# Patient Record
Sex: FEMALE | Employment: OTHER | URBAN - METROPOLITAN AREA
[De-identification: names, ages, dates, MRNs, and addresses within clinical notes are randomized per-mention and may not be internally consistent; named-entity substitution may affect disease eponyms.]

---

## 2017-01-16 ENCOUNTER — TELEPHONE (OUTPATIENT)
Dept: CARDIOLOGY CLINIC | Age: 67
End: 2017-01-16

## 2017-01-16 NOTE — TELEPHONE ENCOUNTER
Spoke to patient's son, Leticia Acevedo. Identifiers x 2. Instructed to arrive 30 minutes prior to appt. , bring list of meds with dosage and frequency or bottles including vitamins and supplements, license and insurance cards.   .  To contact PCP regarding pertinent labs, etc.

## 2017-01-17 ENCOUNTER — OFFICE VISIT (OUTPATIENT)
Dept: CARDIOLOGY CLINIC | Age: 67
End: 2017-01-17

## 2017-01-17 VITALS
RESPIRATION RATE: 16 BRPM | OXYGEN SATURATION: 99 % | HEART RATE: 178 BPM | DIASTOLIC BLOOD PRESSURE: 60 MMHG | SYSTOLIC BLOOD PRESSURE: 98 MMHG | HEIGHT: 65 IN | BODY MASS INDEX: 24.19 KG/M2 | WEIGHT: 145.2 LBS

## 2017-01-17 DIAGNOSIS — I48.91 ATRIAL FIBRILLATION, UNSPECIFIED TYPE (HCC): Primary | ICD-10-CM

## 2017-01-17 DIAGNOSIS — I49.9 IRREGULAR HEART BEAT: ICD-10-CM

## 2017-01-17 PROBLEM — E03.9 ACQUIRED HYPOTHYROIDISM: Status: ACTIVE | Noted: 2017-01-17

## 2017-01-17 RX ORDER — HYDROCODONE BITARTRATE AND ACETAMINOPHEN 5; 325 MG/1; MG/1
1 TABLET ORAL AS NEEDED
COMMUNITY
End: 2017-02-17

## 2017-01-17 RX ORDER — MECLIZINE HYDROCHLORIDE 25 MG/1
25 TABLET ORAL AS NEEDED
COMMUNITY
End: 2017-02-17

## 2017-01-17 RX ORDER — LEVOTHYROXINE AND LIOTHYRONINE 38; 9 UG/1; UG/1
45 TABLET ORAL DAILY
COMMUNITY

## 2017-01-17 RX ORDER — DIGOXIN 250 MCG
TABLET ORAL
Qty: 32 TAB | Refills: 3 | Status: SHIPPED | OUTPATIENT
Start: 2017-01-17 | End: 2017-01-17 | Stop reason: SDUPTHER

## 2017-01-17 RX ORDER — DIGOXIN 250 MCG
0.25 TABLET ORAL DAILY
COMMUNITY
End: 2017-01-17 | Stop reason: SDUPTHER

## 2017-01-17 NOTE — PATIENT INSTRUCTIONS
Digoxin . 25 mg every 6 hours today only,then tomorrow 1 tablet daily    Start Xarelto 15 mg daily    Have blood work drawn today    Follow up with Chinmay Severino on Friday

## 2017-01-17 NOTE — MR AVS SNAPSHOT
Visit Information Date & Time Provider Department Dept. Phone Encounter #  
 1/17/2017  9:00 AM Lisbeth Alberto MD [facility] Riccardo Burns [phone] [id number] Follow-up Instructions Return in about 3 days (around 1/20/2017). Follow-up and Disposition History Upcoming Health Maintenance Date Due Hepatitis C Screening 1950 DTaP/Tdap/Td series (1 - Tdap) 12/15/1971 BREAST CANCER SCRN MAMMOGRAM 12/15/2000 FOBT Q 1 YEAR AGE 50-75 12/15/2000 ZOSTER VACCINE AGE 60> 12/15/2010 GLAUCOMA SCREENING Q2Y 12/15/2015 OSTEOPOROSIS SCREENING (DEXA) 12/15/2015 Pneumococcal 65+ Low/Medium Risk (1 of 2 - PCV13) 12/15/2015 MEDICARE YEARLY EXAM 12/15/2015 INFLUENZA AGE 9 TO ADULT 8/1/2016 Allergies as of 1/17/2017  Review Complete On: 1/17/2017 By: Lisbeth Alberto MD  
 No Known Allergies Current Immunizations  Never Reviewed No immunizations on file. Not reviewed this visit You Were Diagnosed With   
  
 Codes Comments [facility]    -  Primary ICD-10-CM: I48.91 
ICD-9-CM: 427.31 Irregular heart beat     ICD-10-CM: I49.9 ICD-9-CM: 427.9 Vitals BP Pulse Resp Height(growth percentile) Weight(growth percentile) SpO2  
 98/60 (BP 1 Location: Right arm, BP Patient Position: Sitting) (!) 178 16 5' 5\" (1.651 m) 145 lb 3.2 oz (65.9 kg) 99% BMI Smoking Status 24.16 kg/m2 Former Smoker BMI and BSA Data Body Mass Index Body Surface Area  
 24.16 kg/m 2 1.74 m 2 Your Updated Medication List  
  
   
This list is accurate as of: 1/17/17 10:23 AM.  Always use your most recent med list.  
  
  
  
  
 ARMOUR THYROID 60 mg tablet Generic drug:  thyroid (Pork) Take 60 mg by mouth daily. digoxin 0.25 mg tablet Commonly known as:  LANOXIN Take 0.25 mg by mouth daily. 1 tablet every 6 hours today Start tomorrow one tablet daily HYDROcodone-acetaminophen 5-325 mg per tablet Commonly known as:  Geena Fothergill Take 1 Tab by mouth as needed for Pain. meclizine 25 mg tablet Commonly known as:  ANTIVERT Take 25 mg by mouth as needed. XARELTO 15 mg Tab tablet Generic drug:  rivaroxaban Take 15 mg by mouth daily. We Performed the Following AMB POC EKG ROUTINE W/ 12 LEADS, INTER & REP [95183 CPT(R)] CBC WITH AUTOMATED DIFF [14544 CPT(R)] MAGNESIUM [id number] CPT(R)] METABOLIC PANEL, COMPREHENSIVE [97730 CPT(R)] TSH 3RD GENERATION [16622 CPT(R)] Follow-up Instructions Return in about 3 days (around 1/20/2017). Patient Instructions Digoxin . 25 mg every 6 hours today only,then tomorrow 1 tablet daily Start Xarelto 15 mg daily Have blood work drawn today Follow up with Vandana Both on Friday Patient Instructions History Introducing \[facility] [place] [facility]\"" & HEALTH SERVICES! Christopher Nick introduces [employer] patient portal. Now you can access parts of your medical record, email your doctor's office, and request medication refills online. 1. In your internet browser, go to [email or web address] 2. Click on the First Time User? Click Here link in the Sign In box. You will see the New Member Sign Up page. 3. Enter your [employer] Access Code exactly as it appears below. You will not need to use this code after youve completed the sign-up process. If you do not sign up before the expiration date, you must request a new code. · [employer] Access Code: [email or web address] Expires: 4/17/2017  7:35 AM 
 
4. Enter the last four digits of your Social Security Number (xxxx) and Date of Birth (mm/dd/yyyy) as indicated and click Submit. You will be taken to the next sign-up page. 5. Create a AutoMedxt ID. This will be your AutoMedxt login ID and cannot be changed, so think of one that is secure and easy to remember. 6. Create a AutoMedxt password. You can change your password at any time. 7. Enter your Password Reset Question and Answer. This can be used at a later time if you forget your password. 8. Enter your e-mail address. You will receive e-mail notification when new information is available in [address] 9. Click Sign Up. You can now view and download portions of your medical record. 10. Click the Download Summary menu link to download a portable copy of your medical information. If you have questions, please visit the Frequently Asked Questions section of the Brainjuicer website. Remember, Brainjuicer is NOT to be used for urgent needs. For medical emergencies, dial 911. Now available from your iPhone and Android! Please provide this summary of care documentation to your next provider. Your primary care clinician is listed as [address]. If you have any questions after today's visit, please call [phone].

## 2017-01-17 NOTE — PROGRESS NOTES
HISTORY OF PRESENT ILLNESS  Shirlean Libman is a 77 y.o. female. 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 Cardiovascular: Positive for palpitations. Neurological: Positive for dizziness. Visit Vitals    BP 98/60 (BP 1 Location: Right arm, BP Patient Position: Sitting)    Pulse (!) 178    Resp 16    Ht 5' 5\" (1.651 m)    Wt 65.9 kg (145 lb 3.2 oz)    SpO2 99%    BMI 24.16 kg/m2       Physical Exam   Neck: No JVD present. Carotid bruit is not present. Cardiovascular: An irregularly irregular rhythm present. Tachycardia present. Pulmonary/Chest: Effort normal and breath sounds normal.   Abdominal: Soft. Musculoskeletal: She exhibits no edema. Psychiatric: She has a normal mood and affect. No current outpatient prescriptions on file prior to visit. No current facility-administered medications on file prior to visit. ASSESSMENT and PLAN  PAF: now with AF with RVR, ecg with AF rate 178 and minor nstt. Discussed at length options.  She declines hospital admission at this time understanding implications  Her [id number] vasc score is 2 at this time (age and gender) discussed use of [address], in my opinion we should start also in potential preparation for  Cardioversion  Discussed  Potential complications of [address] including major bleeding requiring transfusions hospitalizations and rarely death  Patient understood  Start xarelto 15 mg for now  Ineed to better control HR but BP already low and as outpatient difficult to start her on ca blockers or BB, start digoxin 0.25 mg q 6 h x 2 and then 0,25 mg daily  Proceed with echo cmp tsh cbc and mg  See her back in 3 days

## 2017-01-18 ENCOUNTER — TELEPHONE (OUTPATIENT)
Dept: CARDIOLOGY CLINIC | Age: 67
End: 2017-01-18

## 2017-01-18 LAB
ALBUMIN SERPL-MCNC: 3.8 G/DL (ref 3.6–4.8)
ALBUMIN/GLOB SERPL: 1.3 {RATIO} (ref 1.1–2.5)
ALP SERPL-CCNC: 59 IU/L (ref 39–117)
ALT SERPL-CCNC: 8 IU/L (ref 0–32)
AST SERPL-CCNC: 16 IU/L (ref 0–40)
BASOPHILS # BLD AUTO: 0.1 X10E3/UL (ref 0–0.2)
BASOPHILS NFR BLD AUTO: 1 %
BILIRUB SERPL-MCNC: 0.3 MG/DL (ref 0–1.2)
BUN SERPL-MCNC: 24 MG/DL (ref 8–27)
BUN/CREAT SERPL: 30 (ref 11–26)
CALCIUM SERPL-MCNC: 9.4 MG/DL (ref 8.7–10.3)
CHLORIDE SERPL-SCNC: 105 MMOL/L (ref 96–106)
CO2 SERPL-SCNC: 23 MMOL/L (ref 18–29)
CREAT SERPL-MCNC: 0.8 MG/DL (ref 0.57–1)
EOSINOPHIL # BLD AUTO: 0 X10E3/UL (ref 0–0.4)
EOSINOPHIL NFR BLD AUTO: 0 %
ERYTHROCYTE [DISTWIDTH] IN BLOOD BY AUTOMATED COUNT: 13.8 % (ref 12.3–15.4)
GLOBULIN SER CALC-MCNC: 2.9 G/DL (ref 1.5–4.5)
GLUCOSE SERPL-MCNC: 92 MG/DL (ref 65–99)
HCT VFR BLD AUTO: 43.7 % (ref 34–46.6)
HGB BLD-MCNC: 14.4 G/DL (ref 11.1–15.9)
IMM GRANULOCYTES # BLD: 0 X10E3/UL (ref 0–0.1)
IMM GRANULOCYTES NFR BLD: 0 %
LYMPHOCYTES # BLD AUTO: 2 X10E3/UL (ref 0.7–3.1)
LYMPHOCYTES NFR BLD AUTO: 20 %
MAGNESIUM SERPL-MCNC: 1.8 MG/DL (ref 1.6–2.3)
MCH RBC QN AUTO: 31.9 PG (ref 26.6–33)
MCHC RBC AUTO-ENTMCNC: 33 G/DL (ref 31.5–35.7)
MCV RBC AUTO: 97 FL (ref 79–97)
MONOCYTES # BLD AUTO: 0.8 X10E3/UL (ref 0.1–0.9)
MONOCYTES NFR BLD AUTO: 8 %
NEUTROPHILS # BLD AUTO: 7.3 X10E3/UL (ref 1.4–7)
NEUTROPHILS NFR BLD AUTO: 71 %
PLATELET # BLD AUTO: 249 X10E3/UL (ref 150–379)
POTASSIUM SERPL-SCNC: 4 MMOL/L (ref 3.5–5.2)
PROT SERPL-MCNC: 6.7 G/DL (ref 6–8.5)
RBC # BLD AUTO: 4.52 X10E6/UL (ref 3.77–5.28)
SODIUM SERPL-SCNC: 144 MMOL/L (ref 134–144)
TSH SERPL DL<=0.005 MIU/L-ACNC: 0.16 UIU/ML (ref 0.45–4.5)
WBC # BLD AUTO: 10.2 X10E3/UL (ref 3.4–10.8)

## 2017-01-18 RX ORDER — DIGOXIN 250 MCG
TABLET ORAL
Qty: 32 TAB | Refills: 3 | Status: SHIPPED | OUTPATIENT
Start: 2017-01-18 | End: 2017-02-17 | Stop reason: DRUGHIGH

## 2017-01-18 NOTE — TELEPHONE ENCOUNTER
Verified patient with two patient identifiers. Spoke with patient's son Geeta Meehan) and he was advised to let his mom know to be in office @ 9 AM on Friday for Echo  and see Yajaira Baldwin.

## 2017-01-20 ENCOUNTER — CLINICAL SUPPORT (OUTPATIENT)
Dept: CARDIOLOGY CLINIC | Age: 67
End: 2017-01-20

## 2017-01-20 ENCOUNTER — OFFICE VISIT (OUTPATIENT)
Dept: CARDIOLOGY CLINIC | Age: 67
End: 2017-01-20

## 2017-01-20 VITALS
BODY MASS INDEX: 23.99 KG/M2 | DIASTOLIC BLOOD PRESSURE: 90 MMHG | RESPIRATION RATE: 16 BRPM | HEART RATE: 58 BPM | WEIGHT: 144 LBS | HEIGHT: 65 IN | SYSTOLIC BLOOD PRESSURE: 146 MMHG | OXYGEN SATURATION: 98 %

## 2017-01-20 DIAGNOSIS — I48.91 ATRIAL FIBRILLATION, UNSPECIFIED TYPE (HCC): Primary | ICD-10-CM

## 2017-01-20 NOTE — PROGRESS NOTES
HISTORY OF PRESENT ILLNESS  Jose Hooper is a 77 y.o. female. 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 Cardiovascular: Negative. Physical Exam   Visit Vitals    /90 (BP 1 Location: Left arm, BP Patient Position: Sitting)    Pulse (!) 58    Resp 16    Ht 5' 5\" (1.651 m)    Wt 65.3 kg (144 lb)    SpO2 98%    BMI 23.96 kg/m2       Lab Results   Component Value Date/Time    TSH 0.159 01/17/2017 10:49 AM     Lab Results   Component Value Date/Time    Sodium 144 01/17/2017 10:49 AM    Potassium 4.0 01/17/2017 10:49 AM    Chloride 105 01/17/2017 10:49 AM    CO2 23 01/17/2017 10:49 AM    Glucose 92 01/17/2017 10:49 AM    BUN 24 01/17/2017 10:49 AM    Creatinine 0.80 01/17/2017 10:49 AM    BUN/Creatinine ratio 30 01/17/2017 10:49 AM    GFR est AA 89 01/17/2017 10:49 AM    GFR est non-AA 77 01/17/2017 10:49 AM    Calcium 9.4 01/17/2017 10:49 AM    Bilirubin, total 0.3 01/17/2017 10:49 AM    ALT 8 01/17/2017 10:49 AM    AST 16 01/17/2017 10:49 AM    Alk. phosphatase 59 01/17/2017 10:49 AM    Protein, total 6.7 01/17/2017 10:49 AM    Albumin 3.8 01/17/2017 10:49 AM    A-G Ratio 1.3 01/17/2017 10:49 AM     Lab Results   Component Value Date/Time    WBC 10.2 01/17/2017 10:49 AM    HGB 14.4 01/17/2017 10:49 AM    HCT 43.7 01/17/2017 10:49 AM    PLATELET 607 32/56/6813 10:49 AM    MCV 97 01/17/2017 10:49 AM       ASSESSMENT and PLAN  PAF: currently in NSR . Her ECG shows NSR/SB minor nstt (likely digoxin effect) and borderline LAE. Discussed options. Continue digoxin and xarelto. Discussed labs result.  Her TSH is a bit low and this may have an impact on her PAF. She will follow up soon with her pcp to see if possible to change dosage of thyroid supplement  In the meanwhile continue digoxin and xarelto, no untoward effects thus far.   Her MRL6vd0 vasc score is 2 if no cardioversion needed and no recurrent af then I will stop xarelto in 4 weeks and start asa 81 mg daily  Her BP is a bit high but low normal she tells me  See her back in 4 weeks

## 2017-01-20 NOTE — PATIENT INSTRUCTIONS
Follow up with Dr. Michael Pearce in 4 weeks. MyChart Activation    Thank you for requesting access to [employer]. Please follow the instructions below to securely access and download your online medical record. [employer] allows you to send messages to your doctor, view your test results, renew your prescriptions, schedule appointments, and more. How Do I Sign Up? 1. In your internet browser, go to [email or web address]  2. Click on the First Time User? Click Here link in the Sign In box. You will be redirect to the New Member Sign Up page. 3. Enter your [employer] Access Code exactly as it appears below. You will not need to use this code after youve completed the sign-up process. If you do not sign up before the expiration date, you must request a new code. [employer] Access Code: [email or web address]  Expires: 2017  7:35 AM (This is the date your [employer] access code will )    4. Enter the last four digits of your Social Security Number (xxxx) and Date of Birth (mm/dd/yyyy) as indicated and click Submit. You will be taken to the next sign-up page. 5. Create a [employer] ID. This will be your [employer] login ID and cannot be changed, so think of one that is secure and easy to remember. 6. Create a [employer] password. You can change your password at any time. 7. Enter your Password Reset Question and Answer. This can be used at a later time if you forget your password. 8. Enter your e-mail address. You will receive e-mail notification when new information is available in [address] 9. Click Sign Up. You can now view and download portions of your medical record. 10. Click the Download Summary menu link to download a portable copy of your medical information. Additional Information    If you have questions, please visit the Frequently Asked Questions section of the [employer] website at [email or web address] [employer]. [place] [employer] Technology/[place] Online Healthhart/. Remember, [employer] is NOT to be used for urgent needs.  For medical emergencies, dial 911.

## 2017-01-20 NOTE — MR AVS SNAPSHOT
Visit Information Date & Time Provider Department Dept. Phone Encounter #  
 1/20/2017 10:40 AM Rao Santana MD [facility] [phone] [id number] Your Appointments 1/20/2017 10:40 AM  
ESTABLISHED PATIENT with Rao Santana MD  
[facility] ([facility]) Appt Note: f/u per dr Clent Burkitt [address]  
[address] [facility] [address] Upcoming Health Maintenance Date Due Hepatitis C Screening 1950 DTaP/Tdap/Td series (1 - Tdap) 12/15/1971 BREAST CANCER SCRN MAMMOGRAM 12/15/2000 FOBT Q 1 YEAR AGE 50-75 12/15/2000 ZOSTER VACCINE AGE 60> 12/15/2010 GLAUCOMA SCREENING Q2Y 12/15/2015 OSTEOPOROSIS SCREENING (DEXA) 12/15/2015 Pneumococcal 65+ Low/Medium Risk (1 of 2 - PCV13) 12/15/2015 MEDICARE YEARLY EXAM 12/15/2015 INFLUENZA AGE 9 TO ADULT 8/1/2016 Allergies as of 1/20/2017  Review Complete On: 1/20/2017 By: Breana Hamilton No Known Allergies Current Immunizations  Never Reviewed No immunizations on file. Not reviewed this visit You Were Diagnosed With   
  
 Codes Comments Atrial fibrillation, unspecified type ([facility]    -  Primary ICD-10-CM: I48.91 
ICD-9-CM: 427.31 Vitals BP Pulse Resp Height(growth percentile) Weight(growth percentile) SpO2  
 146/90 (BP 1 Location: Left arm, BP Patient Position: Sitting) (!) 58 16 5' 5\" (1.651 m) 144 lb (65.3 kg) 98% BMI Smoking Status 23.96 kg/m2 Former Smoker Vitals History BMI and BSA Data Body Mass Index Body Surface Area  
 23.96 kg/m 2 1.73 m 2 Preferred Pharmacy Pharmacy Name Phone [id number] [address] [facility] [phone] Your Updated Medication List  
  
   
 This list is accurate as of: 17 10:08 AM.  Always use your most recent med list.  
  
  
  
  
 ARMOUR THYROID 60 mg tablet Generic drug:  thyroid (Pork) Take 60 mg by mouth daily. digoxin 0.25 mg tablet Commonly known as:  LANOXIN  
1 tablet every 6 hours today Start tomorrow one tablet daily HYDROcodone-acetaminophen 5-325 mg per tablet Commonly known as:  Morelia President Take 1 Tab by mouth as needed for Pain. meclizine 25 mg tablet Commonly known as:  ANTIVERT Take 25 mg by mouth as needed. XARELTO 15 mg Tab tablet Generic drug:  rivaroxaban Take 15 mg by mouth daily. We Performed the Following AMB POC EKG ROUTINE  LEADS, INTER & REP [62846 CPT(R)] Patient Instructions Follow up with Dr. Christian Benitez in 4 weeks. [employer] Activation Thank you for requesting access to [employer]. Please follow the instructions below to securely access and download your online medical record. [employer]io allows you to send messages to your doctor, view your test results, renew your prescriptions, schedule appointments, and more. How Do I Sign Up? 1. In your internet browser, go to [email or web address] 
2. Click on the First Time User? Click Here link in the Sign In box. You will be redirect to the New Member Sign Up page. 3. Enter your [employer] Access Code exactly as it appears below. You will not need to use this code after youve completed the sign-up process. If you do not sign up before the expiration date, you must request a new code. [employer] Access Code: [email or web address] Expires: 2017  7:35 AM (This is the date your Yozio access code will ) 4. Enter the last four digits of your Social Security Number (xxxx) and Date of Birth (mm/dd/yyyy) as indicated and click Submit. You will be taken to the next sign-up page. 5. Create a Yozio ID.  This will be your Yozio login ID and cannot be changed, so think of one that is secure and easy to remember. 6. Create a [employer] password. You can change your password at any time. 7. Enter your Password Reset Question and Answer. This can be used at a later time if you forget your password. 8. Enter your e-mail address. You will receive e-mail notification when new information is available in [address] 9. Click Sign Up. You can now view and download portions of your medical record. 10. Click the Download Summary menu link to download a portable copy of your medical information. Additional Information If you have questions, please visit the Frequently Asked Questions section of the [employer] website at [email or web address] Remember, [employer] is NOT to be used for urgent needs. For medical emergencies, dial 911. Introducing [facility] & HEALTH SERVICES! Romayne Duster introduces [employer] patient portal. Now you can access parts of your medical record, email your doctor's office, and request medication refills online. 1. In your internet browser, go to [email or web address] 2. Click on the First Time User? Click Here link in the Sign In box. You will see the New Member Sign Up page. 3. Enter your [employer] Access Code exactly as it appears below. You will not need to use this code after youve completed the sign-up process. If you do not sign up before the expiration date, you must request a new code. · [employer] Access Code: [email or web address] Expires: 4/17/2017  7:35 AM 
 
4. Enter the last four digits of your Social Security Number (xxxx) and Date of Birth (mm/dd/yyyy) as indicated and click Submit. You will be taken to the next sign-up page. 5. Create a [employer] ID. This will be your [employer] login ID and cannot be changed, so think of one that is secure and easy to remember. 6. Create a [employer] password. You can change your password at any time. 7. Enter your Password Reset Question and Answer. This can be used at a later time if you forget your password. 8. Enter your e-mail address. You will receive e-mail notification when new information is available in [address] 9. Click Sign Up. You can now view and download portions of your medical record. 10. Click the Download Summary menu link to download a portable copy of your medical information. If you have questions, please visit the Frequently Asked Questions section of the [employer] website. Remember, [employer] is NOT to be used for urgent needs. For medical emergencies, dial 911. Now available from your iPhone and Android! Please provide this summary of care documentation to your next provider. Your primary care clinician is listed as [address]. If you have any questions after today's visit, please call [phone].

## 2017-02-17 ENCOUNTER — OFFICE VISIT (OUTPATIENT)
Dept: CARDIOLOGY CLINIC | Age: 67
End: 2017-02-17

## 2017-02-17 VITALS
WEIGHT: 146.2 LBS | BODY MASS INDEX: 24.36 KG/M2 | RESPIRATION RATE: 16 BRPM | HEIGHT: 65 IN | DIASTOLIC BLOOD PRESSURE: 84 MMHG | OXYGEN SATURATION: 96 % | SYSTOLIC BLOOD PRESSURE: 130 MMHG | HEART RATE: 60 BPM

## 2017-02-17 DIAGNOSIS — I48.91 ATRIAL FIBRILLATION, UNSPECIFIED TYPE (HCC): Primary | ICD-10-CM

## 2017-02-17 RX ORDER — DIGOXIN 250 MCG
0.25 TABLET ORAL DAILY
Qty: 30 TAB | Refills: 3 | Status: SHIPPED | OUTPATIENT
Start: 2017-02-17 | End: 2017-04-18 | Stop reason: SDUPTHER

## 2017-02-17 RX ORDER — DIGOXIN 250 MCG
0.25 TABLET ORAL DAILY
COMMUNITY
End: 2017-02-17 | Stop reason: SDUPTHER

## 2017-02-17 NOTE — MR AVS SNAPSHOT
Visit Information Date & Time Provider Department Dept. Phone Encounter #  
 2/17/2017 10:20 AM Josh Mejia MD [facility] [place] [phone] [id number] Upcoming Health Maintenance Date Due Hepatitis C Screening 1950 DTaP/Tdap/Td series (1 - Tdap) 12/15/1971 BREAST CANCER SCRN MAMMOGRAM 12/15/2000 FOBT Q 1 YEAR AGE 50-75 12/15/2000 ZOSTER VACCINE AGE 60> 12/15/2010 GLAUCOMA SCREENING Q2Y 12/15/2015 OSTEOPOROSIS SCREENING (DEXA) 12/15/2015 Pneumococcal 65+ Low/Medium Risk (1 of 2 - PCV13) 12/15/2015 MEDICARE YEARLY EXAM 12/15/2015 INFLUENZA AGE 9 TO ADULT 8/1/2016 Allergies as of 2/17/2017  Review Complete On: 2/17/2017 By: Axel Maciel RN No Known Allergies Current Immunizations  Never Reviewed No immunizations on file. Not reviewed this visit Vitals BP Pulse Resp Height(growth percentile) Weight(growth percentile) SpO2  
 130/84 (BP 1 Location: Left arm, BP Patient Position: Sitting) 60 16 5' 5\" (1.651 m) 146 lb 3.2 oz (66.3 kg) 96% BMI Smoking Status 24.33 kg/m2 Former Smoker BMI and BSA Data Body Mass Index Body Surface Area  
 24.33 kg/m 2 1.74 m 2 Preferred Pharmacy Pharmacy Name Phone [id number] [address] [facility] [phone] Your Updated Medication List  
  
   
This list is accurate as of: 2/17/17 11:36 AM.  Always use your most recent med list.  
  
  
  
  
 ARMOUR THYROID 60 mg tablet Generic drug:  thyroid (Pork) Take 30 mg by mouth daily. digoxin 0.25 mg tablet Commonly known as:  LANOXIN  
1 tablet every 6 hours today Start tomorrow one tablet daily XARELTO 15 mg Tab tablet Generic drug:  rivaroxaban Take 15 mg by mouth daily. Patient Instructions Follow up with Nic Zeng in 2 months Introducing [facility] & HEALTH SERVICES! Nick Trejo introduces [employer] patient portal. Now you can access parts of your medical record, email your doctor's office, and request medication refills online. 1. In your internet browser, go to [email or web address] 2. Click on the First Time User? Click Here link in the Sign In box. You will see the New Member Sign Up page. 3. Enter your [employer] Access Code exactly as it appears below. You will not need to use this code after youve completed the sign-up process. If you do not sign up before the expiration date, you must request a new code. · [employer] Access Code: [email or web address] Expires: 4/17/2017  7:35 AM 
 
4. Enter the last four digits of your Social Security Number (xxxx) and Date of Birth (mm/dd/yyyy) as indicated and click Submit. You will be taken to the next sign-up page. 5. Create a [employer] ID. This will be your [employer] login ID and cannot be changed, so think of one that is secure and easy to remember. 6. Create a [employer] password. You can change your password at any time. 7. Enter your Password Reset Question and Answer. This can be used at a later time if you forget your password. 8. Enter your e-mail address. You will receive e-mail notification when new information is available in [address] 9. Click Sign Up. You can now view and download portions of your medical record. 10. Click the Download Summary menu link to download a portable copy of your medical information. If you have questions, please visit the Frequently Asked Questions section of the [employer] website. Remember, [employer] is NOT to be used for urgent needs. For medical emergencies, dial 911. Now available from your iPhone and Android! Please provide this summary of care documentation to your next provider. Your primary care clinician is listed as [address]. If you have any questions after today's visit, please call [phone].

## 2017-02-17 NOTE — PROGRESS NOTES
HISTORY OF PRESENT ILLNESS  Alex Medina is a 77 y.o. female. 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  FH: not significant for early [address] or AF  HPI  Overall better  Only one episode of palpitations lasting 2 hours since last ov   bp no longer low  Review of Systems   Respiratory: Negative. Cardiovascular: Negative. Physical Exam  Physical Exam   Blood pressure 130/84, pulse 60, resp. rate 16, height 5' 5\" (1.651 m), weight 66.3 kg (146 lb 3.2 oz), SpO2 96 %. Constitutional: She is oriented to person, place, and time. She appears well-developed and well-nourished. No distress. HENT: Head: Normocephalic. Eyes: No scleral icterus. Neck: Normal range of motion. Neck supple. No JVD present. No tracheal deviation present. Cardiovascular: Normal rate, regular rhythm, normal heart sounds and intact distal pulses. Exam reveals no gallop and no friction rub. No murmur heard. Pulmonary/Chest: Effort normal and breath sounds normal. No stridor. No respiratory distress, wheezes or  rales. Abdominal: She exhibits no distension. Musculoskeletal: She exhibits no edema. Neurological: She is alert and oriented to person, place, and time. Coordination normal.   Skin: Skin is warm. No rash noted. Not diaphoretic. No erythema. Psychiatric:  Normal mood and affect. Behavior is normal.   Current Outpatient Prescriptions on File Prior to Visit   Medication Sig Dispense Refill    thyroid, Pork, (ARMOUR THYROID) 60 mg tablet Take 30 mg by mouth daily. No current facility-administered medications on file prior to visit. ASSESSMENT and PLAN  PAF: currently in NSR . Discussed options. Thyroid supplementation just decreased 3 weeks ago and much less PAF. Hold off adding toprol but continue with xarelto for now, wait 2 more months and if still no recurrent AF then stop xarelto and start asa. Continue digoxin and xarelto. In the meanwhile continue digoxin and xarelto, no untoward effects thus far.   Her [id number] vasc score is 2 if no cardioversion needed and no recurrent af then I will stop xarelto   Her BP is normal  See her back in 2 months if recurrent PAF she will let me know and will call in toprol xl 12.5 mg

## 2017-02-17 NOTE — PROGRESS NOTES
Chief Complaint   Patient presents with    Irregular Heart Beat      1 month follow up. Denies chest pain/shortness of breath/swelling.

## 2017-04-18 ENCOUNTER — OFFICE VISIT (OUTPATIENT)
Dept: CARDIOLOGY CLINIC | Age: 67
End: 2017-04-18

## 2017-04-18 VITALS
DIASTOLIC BLOOD PRESSURE: 70 MMHG | BODY MASS INDEX: 23.59 KG/M2 | HEART RATE: 52 BPM | RESPIRATION RATE: 16 BRPM | SYSTOLIC BLOOD PRESSURE: 140 MMHG | WEIGHT: 141.6 LBS | HEIGHT: 65 IN

## 2017-04-18 DIAGNOSIS — I48.91 ATRIAL FIBRILLATION, UNSPECIFIED TYPE (HCC): Primary | ICD-10-CM

## 2017-04-18 RX ORDER — ASPIRIN 81 MG/1
81 TABLET ORAL DAILY
COMMUNITY
End: 2017-08-29 | Stop reason: ALTCHOICE

## 2017-04-18 RX ORDER — DIGOXIN 250 MCG
0.25 TABLET ORAL DAILY
Qty: 30 TAB | Refills: 4 | Status: SHIPPED | OUTPATIENT
Start: 2017-04-18 | End: 2017-05-06 | Stop reason: SDUPTHER

## 2017-04-18 NOTE — MR AVS SNAPSHOT
Visit Information Date & Time Provider Department Dept. Phone Encounter #  
 4/18/2017  9:00 AM Adore Gracia MD [facility] [place] Regalado [phone] [id number] Upcoming Health Maintenance Date Due Hepatitis C Screening 1950 DTaP/Tdap/Td series (1 - Tdap) 12/15/1971 BREAST CANCER SCRN MAMMOGRAM 12/15/2000 FOBT Q 1 YEAR AGE 50-75 12/15/2000 ZOSTER VACCINE AGE 60> 12/15/2010 GLAUCOMA SCREENING Q2Y 12/15/2015 OSTEOPOROSIS SCREENING (DEXA) 12/15/2015 Pneumococcal 65+ Low/Medium Risk (1 of 2 - PCV13) 12/15/2015 MEDICARE YEARLY EXAM 12/15/2015 INFLUENZA AGE 9 TO ADULT 8/1/2016 Allergies as of 4/18/2017  Review Complete On: 4/18/2017 By: Ruby Saul No Known Allergies Current Immunizations  Never Reviewed No immunizations on file. Not reviewed this visit You Were Diagnosed With   
  
 Codes Comments Atrial fibrillation, unspecified type ([facility]    -  Primary ICD-10-CM: I48.91 
ICD-9-CM: 427.31 Vitals BP Pulse Resp Height(growth percentile) Weight(growth percentile) BMI  
 140/70 (BP 1 Location: Left arm, BP Patient Position: Sitting) (!) 52 16 5' 5\" (1.651 m) 141 lb 9.6 oz (64.2 kg) 23.56 kg/m2 Smoking Status Former Smoker Vitals History BMI and BSA Data Body Mass Index Body Surface Area  
 23.56 kg/m 2 1.72 m 2 Preferred Pharmacy Pharmacy Name Phone [id number] [address] [facility] [phone] Your Updated Medication List  
  
   
This list is accurate as of: 4/18/17  9:35 AM.  Always use your most recent med list.  
  
  
  
  
 ARMOUR THYROID 60 mg tablet Generic drug:  thyroid (Pork) Take 30 mg by mouth daily. aspirin delayed-release 81 mg tablet Take 81 mg by mouth daily. digoxin 0.25 mg tablet Commonly known as:  LANOXIN Take 1 Tab by mouth daily. We Performed the Following AMB POC EKG ROUTINE W/ 12 LEADS, INTER & REP [36223 CPT(R)] Patient Instructions Stop Xarelto Start Aspirin 81 mg daily Follow up with Eliceo Woodson in 4 months Introducing [facility] & HEALTH SERVICES! Wilregina Rivera introduces [employer] patient portal. Now you can access parts of your medical record, email your doctor's office, and request medication refills online. 1. In your internet browser, go to [email or web address] 2. Click on the First Time User? Click Here link in the Sign In box. You will see the New Member Sign Up page. 3. Enter your [employer] Access Code exactly as it appears below. You will not need to use this code after youve completed the sign-up process. If you do not sign up before the expiration date, you must request a new code. · [employer] Access Code: [email or web address] Expires: 7/17/2017  9:07 AM 
 
4. Enter the last four digits of your Social Security Number (xxxx) and Date of Birth (mm/dd/yyyy) as indicated and click Submit. You will be taken to the next sign-up page. 5. Create a [employer] ID. This will be your [employer] login ID and cannot be changed, so think of one that is secure and easy to remember. 6. Create a [employer] password. You can change your password at any time. 7. Enter your Password Reset Question and Answer. This can be used at a later time if you forget your password. 8. Enter your e-mail address. You will receive e-mail notification when new information is available in [address] 9. Click Sign Up. You can now view and download portions of your medical record. 10. Click the Download Summary menu link to download a portable copy of your medical information. If you have questions, please visit the Frequently Asked Questions section of the [employer] website. Remember, [employer] is NOT to be used for urgent needs. For medical emergencies, dial 911. Now available from your iPhone and Android! Please provide this summary of care documentation to your next provider. Your primary care clinician is listed as [facility]. If you have any questions after today's visit, please call [phone].

## 2017-04-18 NOTE — PROGRESS NOTES
HISTORY OF PRESENT ILLNESS  Dimitry Bergman is a 77 y.o. female. 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 Cardiovascular: Negative. Physical Exam  Physical Exam   Blood pressure 140/70, pulse (!) 52, resp. rate 16, height 5' 5\" (1.651 m), weight 64.2 kg (141 lb 9.6 oz). Constitutional: She is oriented to person, place, and time. She appears well-developed and well-nourished. No distress. HENT: Head: Normocephalic. Eyes: No scleral icterus. Neck: Normal range of motion. Neck supple. No JVD present. No tracheal deviation present. Cardiovascular: Normal rate, regular rhythm, normal heart sounds and intact distal pulses. Exam reveals no gallop and no friction rub. No murmur heard. Pulmonary/Chest: Effort normal and breath sounds normal. No stridor. No respiratory distress, wheezes or  rales. Abdominal: She exhibits no distension. Musculoskeletal: She exhibits no edema. Neurological: She is alert and oriented to person, place, and time. Coordination normal.   Skin: Skin is warm. No rash noted. Not diaphoretic. No erythema. Psychiatric:  Normal mood and affect. Behavior is normal.   Current Outpatient Prescriptions on File Prior to Visit   Medication Sig Dispense Refill    digoxin (LANOXIN) 0.25 mg tablet Take 1 Tab by mouth daily. 30 Tab 3    thyroid, Pork, (ARMOUR THYROID) 60 mg tablet Take 30 mg by mouth daily. No current facility-administered medications on file prior to visit. ASSESSMENT and PLAN  PAF: currently in NSR .  Her ECG shows sinus bradycardia at 52 and minor nstt likely related to digoxin. Likely AF triggered by thyroid issues , now better Thyroid supplementation closely followed by her pcp . No recurrent palpitations in >2 months. To be noted that the patient is keenly aware and immediately symptomatic if any PAF at all even if brief, she assures me that she is able to detect ie right away karl time. Discussed options stop xarelto and start asa 81 mg daily. BP very low usually and for his not on toprol and digoxin only  This will be continued  Hold off adding toprol .  Her [id number] vasc score is 2 at this time  Her BP is normal  See her back in 4 months if recurrent PAF she will let me know and will call in toprol xl 12.5 mg and she will resume xarelto immediately

## 2017-05-06 RX ORDER — DIGOXIN 250 MCG
TABLET ORAL
Qty: 30 TAB | Refills: 3 | Status: SHIPPED | OUTPATIENT
Start: 2017-05-06 | End: 2017-08-28 | Stop reason: SDUPTHER

## 2017-08-28 ENCOUNTER — OFFICE VISIT (OUTPATIENT)
Dept: CARDIOLOGY CLINIC | Age: 67
End: 2017-08-28

## 2017-08-28 VITALS
DIASTOLIC BLOOD PRESSURE: 70 MMHG | SYSTOLIC BLOOD PRESSURE: 100 MMHG | BODY MASS INDEX: 23.43 KG/M2 | WEIGHT: 140.6 LBS | HEIGHT: 65 IN

## 2017-08-28 DIAGNOSIS — I48.0 PAROXYSMAL ATRIAL FIBRILLATION (HCC): Primary | ICD-10-CM

## 2017-08-28 RX ORDER — METOPROLOL SUCCINATE 25 MG/1
TABLET, EXTENDED RELEASE ORAL
Qty: 15 TAB | Refills: 3 | Status: SHIPPED | OUTPATIENT
Start: 2017-08-28 | End: 2017-08-29 | Stop reason: DRUGHIGH

## 2017-08-28 RX ORDER — DIGOXIN 250 MCG
TABLET ORAL
Qty: 30 TAB | Refills: 3 | Status: SHIPPED | OUTPATIENT
Start: 2017-08-28 | End: 2017-12-20 | Stop reason: SDUPTHER

## 2017-08-28 RX ORDER — METOPROLOL SUCCINATE 25 MG/1
25 TABLET, EXTENDED RELEASE ORAL DAILY
COMMUNITY
End: 2017-08-28 | Stop reason: SDUPTHER

## 2017-08-28 NOTE — PATIENT INSTRUCTIONS
Start Toprol 25 mg (1/2 tablet at bedtime)    EKG tomorrow    Follow up with Oscar Odonnell in 3 months

## 2017-08-28 NOTE — MR AVS SNAPSHOT
Visit Information Date & Time Provider Department Dept. Phone Encounter #  
 8/28/2017  9:00 AM Stuart Cespedes MD [facility] [place] [phone] [id number] Follow-up Instructions Return in about 3 months (around 11/28/2017). Follow-up and Disposition History Upcoming Health Maintenance Date Due Hepatitis C Screening 1950 DTaP/Tdap/Td series (1 - Tdap) 12/15/1971 BREAST CANCER SCRN MAMMOGRAM 12/15/2000 FOBT Q 1 YEAR AGE 50-75 12/15/2000 ZOSTER VACCINE AGE 60> 10/15/2010 GLAUCOMA SCREENING Q2Y 12/15/2015 OSTEOPOROSIS SCREENING (DEXA) 12/15/2015 Pneumococcal 65+ Low/Medium Risk (1 of 2 - PCV13) 12/15/2015 MEDICARE YEARLY EXAM 12/15/2015 INFLUENZA AGE 9 TO ADULT 8/1/2017 Allergies as of 8/28/2017  Review Complete On: 8/28/2017 By: Stuart Cespedes MD  
 No Known Allergies Current Immunizations  Never Reviewed No immunizations on file. Not reviewed this visit You Were Diagnosed With   
  
 Codes Comments Paroxysmal atrial fibrillation (HCC)    -  Primary ICD-10-CM: I48.0 ICD-9-CM: 427.31 Vitals BP Height(growth percentile) Weight(growth percentile) BMI Smoking Status 100/70 5' 5\" (1.651 m) 140 lb 9.6 oz (63.8 kg) 23.4 kg/m2 Former Smoker Vitals History BMI and BSA Data Body Mass Index Body Surface Area  
 23.4 kg/m 2 1.71 m 2 Preferred Pharmacy Pharmacy Name Phone [id number] [place] [address] [phone] Your Updated Medication List  
  
   
This list is accurate as of: 8/28/17  9:44 AM.  Always use your most recent med list.  
  
  
  
  
 ARMOUR THYROID 60 mg tablet Generic drug:  thyroid (Pork) Take 30 mg by mouth daily. aspirin delayed-release 81 mg tablet Take 81 mg by mouth daily. digoxin 0.25 mg tablet Commonly known as:  LANOXIN  
 TAKE 1 TABLET BY MOUTH EVERY DAY  
  
 TOPROL XL 25 mg XL tablet Generic drug:  metoprolol succinate Take 25 mg by mouth daily. 1/2 tablet at bedtime We Performed the Following AMB POC EKG ROUTINE W/ 12 LEADS, INTER & REP [36677 CPT(R)] Follow-up Instructions Return in about 3 months (around 11/28/2017). Patient Instructions Start Toprol 25 mg (1/2 tablet at bedtime) EKG tomorrow Follow up with Hiren Scott in 3 months Introducing [facility] & HEALTH SERVICES! Maureenbonilla Rigo introduces [employer] patient portal. Now you can access parts of your medical record, email your doctor's office, and request medication refills online. 1. In your internet browser, go to [email or web address] 2. Click on the First Time User? Click Here link in the Sign In box. You will see the New Member Sign Up page. 3. Enter your [employer] Access Code exactly as it appears below. You will not need to use this code after youve completed the sign-up process. If you do not sign up before the expiration date, you must request a new code. · [employer] Access Code: [email or web address] Expires: 11/23/2017  7:36 AM 
 
4. Enter the last four digits of your Social Security Number (xxxx) and Date of Birth (mm/dd/yyyy) as indicated and click Submit. You will be taken to the next sign-up page. 5. Create a [employer] ID. This will be your [employer] login ID and cannot be changed, so think of one that is secure and easy to remember. 6. Create a [employer] password. You can change your password at any time. 7. Enter your Password Reset Question and Answer. This can be used at a later time if you forget your password. 8. Enter your e-mail address. You will receive e-mail notification when new information is available in [address] 9. Click Sign Up. You can now view and download portions of your medical record.  
10. Click the Download Summary menu link to download a portable copy of your medical information. If you have questions, please visit the Frequently Asked Questions section of the [employer] website. Remember, [employer] is NOT to be used for urgent needs. For medical emergencies, dial 911. Now available from your iPhone and Android! Please provide this summary of care documentation to your next provider. If you have any questions after today's visit, please call [phone].

## 2017-08-29 ENCOUNTER — CLINICAL SUPPORT (OUTPATIENT)
Dept: CARDIOLOGY CLINIC | Age: 67
End: 2017-08-29

## 2017-08-29 DIAGNOSIS — I48.0 PAROXYSMAL ATRIAL FIBRILLATION (HCC): Primary | ICD-10-CM

## 2017-08-29 DIAGNOSIS — E78.00 HYPERCHOLESTEREMIA: ICD-10-CM

## 2017-08-29 RX ORDER — METOPROLOL SUCCINATE 25 MG/1
25 TABLET, EXTENDED RELEASE ORAL DAILY
COMMUNITY
End: 2017-08-30 | Stop reason: SDUPTHER

## 2017-08-29 NOTE — MR AVS SNAPSHOT
Visit Information Date & Time Provider Department Dept. Phone Encounter #  
 8/29/2017 11:40 AM Dionne Weiss MD [facility] Malika Bardales [phone] [id number] Your Appointments 11/28/2017  9:00 AM  
ESTABLISHED PATIENT with Dionne Weiss MD  
[facility] ([address]) Appt Note: 3 mnth f/u  
 [address] [facility] [address] [place] [address]  
[address] [address] Upcoming Health Maintenance Date Due Hepatitis C Screening 1950 DTaP/Tdap/Td series (1 - Tdap) 12/15/1971 BREAST CANCER SCRN MAMMOGRAM 12/15/2000 FOBT Q 1 YEAR AGE 50-75 12/15/2000 ZOSTER VACCINE AGE 60> 10/15/2010 GLAUCOMA SCREENING Q2Y 12/15/2015 OSTEOPOROSIS SCREENING (DEXA) 12/15/2015 Pneumococcal 65+ Low/Medium Risk (1 of 2 - PCV13) 12/15/2015 MEDICARE YEARLY EXAM 12/15/2015 INFLUENZA AGE 9 TO ADULT 8/1/2017 Allergies as of 8/29/2017  Review Complete On: 8/28/2017 By: Dionne Weiss MD  
 No Known Allergies Current Immunizations  Never Reviewed No immunizations on file. Not reviewed this visit You Were Diagnosed With   
  
 Codes Comments Paroxysmal atrial fibrillation (HCC)    -  Primary ICD-10-CM: I48.0 ICD-9-CM: 427.31 Hypercholesteremia     ICD-10-CM: E78.00 ICD-9-CM: 272.0 Vitals Smoking Status Former Smoker Preferred Pharmacy [facility] Name Phone [id number] [place] [address] [phone] Your Updated Medication List  
  
   
This list is accurate as of: 8/29/17  1:02 PM.  Always use your most recent med list.  
  
  
  
  
 ARMOUR THYROID 60 mg tablet Generic drug:  thyroid (Pork) Take 30 mg by mouth daily. digoxin 0.25 mg tablet Commonly known as:  LANOXIN  
TAKE 1 TABLET BY MOUTH EVERY DAY  
  
 TOPROL XL 25 mg XL tablet Generic drug:  metoprolol succinate Take 25 mg by mouth daily. 1/2 tablet twice a day XARELTO 20 mg Tab tablet Generic drug:  rivaroxaban Take 20 mg by mouth daily. We Performed the Following AMB POC EKG ROUTINE W/ 12 LEADS, INTER & REP [23533 CPT(R)] LIPID PANEL [01465 CPT(R)] MAGNESIUM [id number] CPT(R)] METABOLIC PANEL, COMPREHENSIVE [05112 CPT(R)] TSH 3RD GENERATION [17349 CPT(R)] Patient Instructions Toprol 12.5 mg twice a day Start Xarelto 20 mg daily Have blood work done today Introducing [facility] & HEALTH SERVICES! George Guzmán introduces [employer] patient portal. Now you can access parts of your medical record, email your doctor's office, and request medication refills online. 1. In your internet browser, go to [email or web address] 2. Click on the First Time User? Click Here link in the Sign In box. You will see the New Member Sign Up page. 3. Enter your [employer] Access Code exactly as it appears below. You will not need to use this code after youve completed the sign-up process. If you do not sign up before the expiration date, you must request a new code. · [employer] Access Code: [email or web address] Expires: 11/23/2017  7:36 AM 
 
4. Enter the last four digits of your Social Security Number (xxxx) and Date of Birth (mm/dd/yyyy) as indicated and click Submit. You will be taken to the next sign-up page. 5. Create a Ps[employer]dex ID. This will be your [employer] login ID and cannot be changed, so think of one that is secure and easy to remember. 6. Create a Ps[employer]dex password. You can change your password at any time. 7. Enter your Password Reset Question and Answer. This can be used at a later time if you forget your password. 8. Enter your e-mail address. You will receive e-mail notification when new information is available in [address] 9. Click Sign Up.  You can now view and download portions of your medical record. 10. Click the Download Summary menu link to download a portable copy of your medical information. If you have questions, please visit the Frequently Asked Questions section of the Ping4 website. Remember, [employer] is NOT to be used for urgent needs. For medical emergencies, dial 911. Now available from your iPhone and Android! Please provide this summary of care documentation to your next provider. If you have any questions after today's visit, please call [phone].

## 2017-08-29 NOTE — PATIENT INSTRUCTIONS
Toprol 12.5 mg twice a day    Start Xarelto 20 mg daily    Have blood work done today    Stop Aspirin

## 2017-08-29 NOTE — PROGRESS NOTES
HISTORY OF PRESENT ILLNESS  Flaca Broussard is a 77 y.o. female. 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  FH: not significant for early [address] or AF  HPI  She feels better she tells me and she has not noticed palpitations at home and her BP machine showed earlier today hr of ?70  Review of Systems   Respiratory: Negative. Cardiovascular: Negative. Physical Exam   Cardiovascular: An irregularly irregular rhythm present. Tachycardia present.         ASSESSMENT and PLAN  AF: it seems persistent even if she tells me that she did not have it this am!. Her ECG shows AF rvr 166 and nstt , I have told her that I am concerned about this and explained potential for SCD /CMP if not dealt with  Discussed dougie cardioversion and risks and benefits but she declines hospital admission today and wants to wait longer   increase toprol to 12,5 mg bid and continue digoxin  Stop asa and start xarelto 20 mg   Obtain cmp mg and tsh and lipids  See her back tomorrow

## 2017-08-30 ENCOUNTER — OFFICE VISIT (OUTPATIENT)
Dept: CARDIOLOGY CLINIC | Age: 67
End: 2017-08-30

## 2017-08-30 VITALS
BODY MASS INDEX: 23.29 KG/M2 | DIASTOLIC BLOOD PRESSURE: 90 MMHG | WEIGHT: 139.8 LBS | OXYGEN SATURATION: 98 % | HEART RATE: 65 BPM | HEIGHT: 65 IN | SYSTOLIC BLOOD PRESSURE: 130 MMHG

## 2017-08-30 DIAGNOSIS — I48.0 PAROXYSMAL ATRIAL FIBRILLATION (HCC): Primary | ICD-10-CM

## 2017-08-30 LAB
ALBUMIN SERPL-MCNC: 3.9 G/DL (ref 3.6–4.8)
ALBUMIN/GLOB SERPL: 1.2 {RATIO} (ref 1.2–2.2)
ALP SERPL-CCNC: 50 IU/L (ref 39–117)
ALT SERPL-CCNC: 13 IU/L (ref 0–32)
AST SERPL-CCNC: 16 IU/L (ref 0–40)
BILIRUB SERPL-MCNC: 0.4 MG/DL (ref 0–1.2)
BUN SERPL-MCNC: 21 MG/DL (ref 8–27)
BUN/CREAT SERPL: 28 (ref 12–28)
CALCIUM SERPL-MCNC: 9.4 MG/DL (ref 8.7–10.3)
CHLORIDE SERPL-SCNC: 102 MMOL/L (ref 96–106)
CHOLEST SERPL-MCNC: 221 MG/DL (ref 100–199)
CO2 SERPL-SCNC: 27 MMOL/L (ref 18–29)
CREAT SERPL-MCNC: 0.76 MG/DL (ref 0.57–1)
GLOBULIN SER CALC-MCNC: 3.2 G/DL (ref 1.5–4.5)
GLUCOSE SERPL-MCNC: 93 MG/DL (ref 65–99)
HDLC SERPL-MCNC: 39 MG/DL
INTERPRETATION, 910389: NORMAL
LDLC SERPL CALC-MCNC: 130 MG/DL (ref 0–99)
MAGNESIUM SERPL-MCNC: 1.9 MG/DL (ref 1.6–2.3)
POTASSIUM SERPL-SCNC: 4.6 MMOL/L (ref 3.5–5.2)
PROT SERPL-MCNC: 7.1 G/DL (ref 6–8.5)
SODIUM SERPL-SCNC: 141 MMOL/L (ref 134–144)
TRIGL SERPL-MCNC: 258 MG/DL (ref 0–149)
TSH SERPL DL<=0.005 MIU/L-ACNC: 0.67 UIU/ML (ref 0.45–4.5)
VLDLC SERPL CALC-MCNC: 52 MG/DL (ref 5–40)

## 2017-08-30 RX ORDER — METOPROLOL SUCCINATE 25 MG/1
TABLET, EXTENDED RELEASE ORAL
Qty: 15 TAB | Refills: 3 | Status: SHIPPED | OUTPATIENT
Start: 2017-08-30

## 2017-08-30 NOTE — MR AVS SNAPSHOT
Visit Information Date & Time Provider Department Dept. Phone Encounter #  
 8/30/2017  9:20 AM Hollie Guerrero MD [facility] [place] [phone] [id number] Follow-up Instructions Return in about 3 months (around 11/30/2017). Follow-up and Disposition History Your Appointments 11/28/2017  9:00 AM  
ESTABLISHED PATIENT with Hollie Guerrero MD  
[facility] ([facility]) Appt Note: 3 mnth f/u  
 [address] [place] [address]  
[address] [address] Upcoming Health Maintenance Date Due Hepatitis C Screening 1950 DTaP/Tdap/Td series (1 - Tdap) 12/15/1971 BREAST CANCER SCRN MAMMOGRAM 12/15/2000 FOBT Q 1 YEAR AGE 50-75 12/15/2000 ZOSTER VACCINE AGE 60> 10/15/2010 GLAUCOMA SCREENING Q2Y 12/15/2015 OSTEOPOROSIS SCREENING (DEXA) 12/15/2015 Pneumococcal 65+ Low/Medium Risk (1 of 2 - PCV13) 12/15/2015 MEDICARE YEARLY EXAM 12/15/2015 INFLUENZA AGE 9 TO ADULT 8/1/2017 Allergies as of 8/30/2017  Review Complete On: 8/30/2017 By: Hollie Guerrero MD  
 No Known Allergies Current Immunizations  Never Reviewed No immunizations on file. Not reviewed this visit You Were Diagnosed With   
  
 Codes Comments Paroxysmal atrial fibrillation (HCC)    -  Primary ICD-10-CM: I48.0 ICD-9-CM: 427.31 Vitals BP Pulse Height(growth percentile) Weight(growth percentile) SpO2 BMI  
 130/90 (BP 1 Location: Right arm, BP Patient Position: Sitting) 65 5' 5\" (1.651 m) 139 lb 12.8 oz (63.4 kg) 98% 23.26 kg/m2 Smoking Status Former Smoker Vitals History BMI and BSA Data Body Mass Index Body Surface Area  
 23.26 kg/m 2 1.71 m 2 Preferred Pharmacy [facility] Name Phone  [id number] [place] [address] [address] [phone] Your Updated Medication List  
  
   
This list is accurate as of: 8/30/17 10:52 AM.  Always use your most recent med list.  
  
  
  
  
 ARMOUR THYROID 60 mg tablet Generic drug:  thyroid (Pork) Take 45 mg by mouth daily. digoxin 0.25 mg tablet Commonly known as:  LANOXIN  
TAKE 1 TABLET BY MOUTH EVERY DAY  
  
 TOPROL XL 25 mg XL tablet Generic drug:  metoprolol succinate Take 25 mg by mouth daily. 1/2 tablet twice a day XARELTO 20 mg Tab tablet Generic drug:  rivaroxaban Take 20 mg by mouth daily. We Performed the Following AMB POC EKG ROUTINE W/ 12 LEADS, INTER & REP [93038 CPT(R)] Follow-up Instructions Return in about 3 months (around 11/30/2017). Patient Instructions Keep November appointment Continue same medications Introducing [facility] & HEALTH SERVICES! [employer] introduces [employer] patient portal. Now you can access parts of your medical record, email your doctor's office, and request medication refills online. 1. In your internet browser, go to [email or web address]://"[employer]. [employer]/"[employer] 2. Click on the First Time User? Click Here link in the Sign In box. You will see the New Member Sign Up page. 3. Enter your [employer] Access Code exactly as it appears below. You will not need to use this code after youve completed the sign-up process. If you do not sign up before the expiration date, you must request a new code. · [employer] Access Code: [email or web address] Expires: 11/23/2017  7:36 AM 
 
4. Enter the last four digits of your Social Security Number (xxxx) and Date of Birth (mm/dd/yyyy) as indicated and click Submit. You will be taken to the next sign-up page. 5. Create a [employer]TV ID. This will be your [employer] login ID and cannot be changed, so think of one that is secure and easy to remember. 6. Create a Z[employer]TV password. You can change your password at any time. 7. Enter your Password Reset Question and Answer. This can be used at a later time if you forget your password. 8. Enter your e-mail address. You will receive e-mail notification when new information is available in [address] 9. Click Sign Up. You can now view and download portions of your medical record. 10. Click the Download Summary menu link to download a portable copy of your medical information. If you have questions, please visit the Frequently Asked Questions section of the [employer] website. Remember, [employer] is NOT to be used for urgent needs. For medical emergencies, dial 911. Now available from your iPhone and Android! Please provide this summary of care documentation to your next provider. Your primary care clinician is listed as Arben Mcelroy. If you have any questions after today's visit, please call [phone].

## 2017-08-30 NOTE — PROGRESS NOTES
HISTORY OF PRESENT ILLNESS  Guzman Stephen is a 77 y.o. female. 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 Respiratory: Negative. Cardiovascular: Negative. Gastrointestinal: Negative. Physical Exam  Lab Results   Component Value Date/Time    Sodium 141 08/29/2017 01:21 PM    Potassium 4.6 08/29/2017 01:21 PM    Chloride 102 08/29/2017 01:21 PM    CO2 27 08/29/2017 01:21 PM    Glucose 93 08/29/2017 01:21 PM    BUN 21 08/29/2017 01:21 PM    Creatinine 0.76 08/29/2017 01:21 PM    BUN/Creatinine ratio 28 08/29/2017 01:21 PM    GFR est AA 95 08/29/2017 01:21 PM    GFR est non-AA 82 08/29/2017 01:21 PM    Calcium 9.4 08/29/2017 01:21 PM     Lab Results   Component Value Date/Time    TSH 0.671 08/29/2017 01:21 PM     Lab Results   Component Value Date/Time    Cholesterol, total 221 08/29/2017 01:21 PM    HDL Cholesterol 39 08/29/2017 01:21 PM    LDL, calculated 130 08/29/2017 01:21 PM    VLDL, calculated 52 08/29/2017 01:21 PM    Triglyceride 258 08/29/2017 01:21 PM     Mg1. 9  Visit Vitals    /90 (BP 1 Location: Right arm, BP Patient Position: Sitting)    Pulse 65    Ht 5' 5\" (1.651 m)    Wt 63.4 kg (139 lb 12.8 oz)    SpO2 98%    BMI 23.26 kg/m2       ASSESSMENT and PLAN  PAF: back in NSR, ECG shows SB at 55, prwp , continue toprol bid 12.5 mg and digoxin , bp seems to tolerate ok and hr acceptable for now.    continue xarelto no untoward effects thus far, off asa for now, would avoid fish oil if possible  Continue xarelto for 3 months if possible then re evaluate  HLD: discussed she will follow with her pcp red rice yeast a possibility but she is aware hat this would not address her tg issue  See her back in 11/17 if no further issues, she will alert me if any recurrent af

## 2017-11-28 ENCOUNTER — OFFICE VISIT (OUTPATIENT)
Dept: CARDIOLOGY CLINIC | Age: 67
End: 2017-11-28

## 2017-11-28 VITALS
OXYGEN SATURATION: 98 % | DIASTOLIC BLOOD PRESSURE: 80 MMHG | RESPIRATION RATE: 16 BRPM | WEIGHT: 133.8 LBS | BODY MASS INDEX: 22.29 KG/M2 | HEART RATE: 59 BPM | HEIGHT: 65 IN | SYSTOLIC BLOOD PRESSURE: 120 MMHG

## 2017-11-28 DIAGNOSIS — I48.0 PAROXYSMAL ATRIAL FIBRILLATION (HCC): Primary | ICD-10-CM

## 2017-11-28 RX ORDER — ASPIRIN 81 MG/1
81 TABLET ORAL DAILY
COMMUNITY

## 2017-11-28 NOTE — MR AVS SNAPSHOT
Visit Information Date & Time Provider Department Dept. Phone Encounter #  
 11/28/2017  9:00 AM Eloise Gu MD [facility] Evy Wei [phone] [id number] Upcoming Health Maintenance Date Due Hepatitis C Screening 1950 DTaP/Tdap/Td series (1 - Tdap) 12/15/1971 BREAST CANCER SCRN MAMMOGRAM 12/15/2000 FOBT Q 1 YEAR AGE 50-75 12/15/2000 ZOSTER VACCINE AGE 60> 10/15/2010 GLAUCOMA SCREENING Q2Y 12/15/2015 OSTEOPOROSIS SCREENING (DEXA) 12/15/2015 Pneumococcal 65+ Low/Medium Risk (1 of 2 - PCV13) 12/15/2015 MEDICARE YEARLY EXAM 12/15/2015 Influenza Age 5 to Adult 8/1/2017 Allergies as of 11/28/2017  Review Complete On: 11/28/2017 By: Russell Ritter No Known Allergies Current Immunizations  Never Reviewed No immunizations on file. Not reviewed this visit You Were Diagnosed With   
  
 Codes Comments Paroxysmal atrial fibrillation (HCC)    -  Primary ICD-10-CM: I48.0 ICD-9-CM: 427.31 Vitals BP Pulse Resp Height(growth percentile) Weight(growth percentile) SpO2  
 120/80 (BP 1 Location: Left arm, BP Patient Position: Sitting) (!) 59 16 5' 5\" (1.651 m) 133 lb 12.8 oz (60.7 kg) 98% BMI Smoking Status 22.27 kg/m2 Former Smoker BMI and BSA Data Body Mass Index Body Surface Area  
 22.27 kg/m 2 1.67 m 2 Preferred Pharmacy Pharmacy Name Phone [id number] [place] [phone] Your Updated Medication List  
  
   
This list is accurate as of: 11/28/17 10:03 AM.  Always use your most recent med list.  
  
  
  
  
 ARMOUR THYROID 60 mg tablet Generic drug:  thyroid (Pork) Take 45 mg by mouth daily. aspirin delayed-release 81 mg tablet Take 81 mg by mouth daily. 2 tablets daily  
  
 digoxin 0.25 mg tablet Commonly known as:  LANOXIN  
TAKE 1 TABLET BY MOUTH EVERY DAY  
  
 metoprolol succinate 25 mg XL tablet Commonly known as:  TOPROL XL  
1/2 tablet twice a day We Performed the Following AMB POC EKG ROUTINE W/ 12 LEADS, INTER & REP [02202 CPT(R)] Patient Instructions Stop Xarelto Start Aspirin 81 mg (2 tablets) daily Follow up with [facility] in 6 months Introducing [address]! [employer] introduces [employer] patient portal. Now you can access parts of your medical record, email your doctor's office, and request medication refills online. 1. In your internet browser, go to [email or web address] 2. Click on the First Time User? Click Here link in the Sign In box. You will see the New Member Sign Up page. 3. Enter your [employer] Access Code exactly as it appears below. You will not need to use this code after youve completed the sign-up process. If you do not sign up before the expiration date, you must request a new code. · [employer] Access Code: [email or web address] Expires: 2/26/2018  9:26 AM 
 
4. Enter the last four digits of your Social Security Number (xxxx) and Date of Birth (mm/dd/yyyy) as indicated and click Submit. You will be taken to the next sign-up page. 5. Create a [employer] ID. This will be your [employer] login ID and cannot be changed, so think of one that is secure and easy to remember. 6. Create a [employer] password. You can change your password at any time. 7. Enter your Password Reset Question and Answer. This can be used at a later time if you forget your password. 8. Enter your e-mail address. You will receive e-mail notification when new information is available in [address] 9. Click Sign Up. You can now view and download portions of your medical record. 10. Click the Download Summary menu link to download a portable copy of your medical information.  
 
If you have questions, please visit the Frequently Asked Questions section of the [facility]. Remember, [facility] is NOT to be used for urgent needs. For medical emergencies, dial 911. Now available from your iPhone and Android! Please provide this summary of care documentation to your next provider. Your primary care clinician is listed as Arben Mcelroy. If you have any questions after today's visit, please call [phone].

## 2017-11-28 NOTE — PROGRESS NOTES
HISTORY OF PRESENT ILLNESS  Vonda Garcia is a 77 y.o. female. 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 Cardiovascular: Negative. Physical Exam   Physical Exam   Blood pressure 120/80, pulse (!) 59, resp. rate 16, height 5' 5\" (1.651 m), weight 60.7 kg (133 lb 12.8 oz), SpO2 98 %. Constitutional: She is oriented to person, place, and time. She appears well-developed and well-nourished. No distress. HENT: Head: Normocephalic. Eyes: No scleral icterus. Neck: Normal range of motion. Neck supple. No JVD present. No tracheal deviation present. Cardiovascular: Normal rate, regular rhythm, normal heart sounds and intact distal pulses. Exam reveals no gallop and no friction rub. No murmur heard. Pulmonary/Chest: Effort normal and breath sounds normal. No stridor. No respiratory distress, wheezes or  rales. Abdominal: She exhibits no distension. Musculoskeletal: She exhibits no edema. Neurological: She is alert and oriented to person, place, and time. Coordination normal.   Skin: Skin is warm. No rash noted. Not diaphoretic. No erythema. Psychiatric:  Normal mood and affect. Behavior is normal.   Current Outpatient Prescriptions on File Prior to Visit   Medication Sig Dispense Refill    rivaroxaban (XARELTO) 20 mg tab tablet Take 1 Tab by mouth daily.  30 Tab 3    digoxin (LANOXIN) 0.25 mg tablet TAKE 1 TABLET BY MOUTH EVERY DAY 30 Tab 3    thyroid, Pork, (ARMOUR THYROID) 60 mg tablet Take 45 mg by mouth daily.  metoprolol succinate (TOPROL XL) 25 mg XL tablet 1/2 tablet twice a day 15 Tab 3     No current facility-administered medications on file prior to visit. Lab Results   Component Value Date/Time    Cholesterol, total 221 08/29/2017 01:21 PM    HDL Cholesterol 39 08/29/2017 01:21 PM    LDL, calculated 130 08/29/2017 01:21 PM    VLDL, calculated 52 08/29/2017 01:21 PM    Triglyceride 258 08/29/2017 01:21 PM       ASSESSMENT and PLAN  PAF: she remains in NSR, ECG shows SB at 59, prwp , nt , unable to tolerate  toprol for legs pain.  Continue digoxin stop xarelto and start asa 162 mg daily, Her CHA2DS 2 vasc score is 2(age and gender) she is able to detect AF very promptly and will let me know if it happens and resume xarelto that day  HLD: discussed red rice yeast to be continue lipids better and closely followed by her pcp  Proceed with echo at the next OV  See her back in 6 months  if no further issues, she will alert me if any recurrent af

## 2019-07-01 NOTE — PROGRESS NOTES
HISTORY OF PRESENT ILLNESS  Gerardo Phan is a 77 y.o. female. 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 Respiratory: Negative. Cardiovascular: Positive for palpitations. Negative for chest pain, orthopnea, claudication, leg swelling and PND. Visit Vitals    /70    Ht 5' 5\" (1.651 m)    Wt 63.8 kg (140 lb 9.6 oz)    BMI 23.4 kg/m2       Physical Exam   Neck: No JVD present. Carotid bruit is not present. Cardiovascular: An irregularly irregular rhythm present. Tachycardia present. Pulmonary/Chest: Effort normal and breath sounds normal.   Abdominal: Soft. Musculoskeletal: She exhibits no edema. Psychiatric: She has a normal mood and affect. Current Outpatient Prescriptions on File Prior to Visit   Medication Sig Dispense Refill    digoxin (LANOXIN) 0.25 mg tablet TAKE 1 TABLET BY MOUTH EVERY DAY 30 Tab 3    aspirin delayed-release 81 mg tablet Take 81 mg by mouth daily.  thyroid, Pork, (ARMOUR THYROID) 60 mg tablet Take 30 mg by mouth daily. No current facility-administered medications on file prior to visit. ASSESSMENT and PLAN  PAF: currently back in AF!, apparently started last night . Her ECG shows AF rvr and nstt. Long conversation with the patient and her  regarding recurrent AF wth which she is clearly symptomatic.  She is absolutely sure that this is the first episode since her ast OV and adamantly declines EP evaluation for possible AF ablation and also wants to hold off AA rx for now. She is aware of potential for CVA and CMP but she does not want to resume [address] and or change medications at this time  Continue digoxin  Obtain ecg tomorrow, if still rrvr in my opinion we should proceed at that time with dougie and cardioversion  Likely AF triggered by thyroid issues , now better Thyroid supplementation closely followed by her pcp  Unfortunately her always low BP limits pharmacological interventions  No recurrent palpitations in 4 months until today .    Add small dose of metoprolol 12 ,5 mg at night   Her [id number] vasc score is 2 at this time  Her BP is low normal  Stress test on the back burner for now  See her back in 4 months if back in NSR tomorrow 20

## 2022-03-19 PROBLEM — E03.9 ACQUIRED HYPOTHYROIDISM: Status: ACTIVE | Noted: 2017-01-17

## 2022-03-20 PROBLEM — I48.91 ATRIAL FIBRILLATION (HCC): Status: ACTIVE | Noted: 2017-01-17

## 2022-10-26 ENCOUNTER — ANESTHESIA (OUTPATIENT)
Dept: ENDOSCOPY | Age: 72
End: 2022-10-26
Payer: COMMERCIAL

## 2022-10-26 ENCOUNTER — ANESTHESIA EVENT (OUTPATIENT)
Dept: ENDOSCOPY | Age: 72
End: 2022-10-26
Payer: COMMERCIAL

## 2022-10-26 ENCOUNTER — HOSPITAL ENCOUNTER (OUTPATIENT)
Age: 72
Setting detail: OUTPATIENT SURGERY
Discharge: HOME | End: 2022-10-26
Attending: INTERNAL MEDICINE | Admitting: INTERNAL MEDICINE
Payer: COMMERCIAL

## 2022-10-26 VITALS
HEIGHT: 63 IN | HEART RATE: 60 BPM | OXYGEN SATURATION: 99 % | WEIGHT: 125.8 LBS | RESPIRATION RATE: 13 BRPM | SYSTOLIC BLOOD PRESSURE: 135 MMHG | DIASTOLIC BLOOD PRESSURE: 61 MMHG | TEMPERATURE: 98 F | BODY MASS INDEX: 22.29 KG/M2

## 2022-10-26 PROCEDURE — 76040000019: Performed by: INTERNAL MEDICINE

## 2022-10-26 PROCEDURE — 74011250636 HC RX REV CODE- 250/636: Performed by: NURSE ANESTHETIST, CERTIFIED REGISTERED

## 2022-10-26 PROCEDURE — 74011000250 HC RX REV CODE- 250: Performed by: NURSE ANESTHETIST, CERTIFIED REGISTERED

## 2022-10-26 PROCEDURE — 76060000031 HC ANESTHESIA FIRST 0.5 HR: Performed by: INTERNAL MEDICINE

## 2022-10-26 PROCEDURE — 74011250636 HC RX REV CODE- 250/636: Performed by: INTERNAL MEDICINE

## 2022-10-26 PROCEDURE — 88305 TISSUE EXAM BY PATHOLOGIST: CPT

## 2022-10-26 PROCEDURE — 2709999900 HC NON-CHARGEABLE SUPPLY: Performed by: INTERNAL MEDICINE

## 2022-10-26 PROCEDURE — 77030013992 HC SNR POLYP ENDOSC BSC -B: Performed by: INTERNAL MEDICINE

## 2022-10-26 RX ORDER — ATROPINE SULFATE 0.1 MG/ML
0.5 INJECTION INTRAVENOUS
Status: CANCELLED | OUTPATIENT
Start: 2022-10-26 | End: 2022-10-27

## 2022-10-26 RX ORDER — FLUMAZENIL 0.1 MG/ML
0.2 INJECTION INTRAVENOUS
Status: CANCELLED | OUTPATIENT
Start: 2022-10-26 | End: 2022-10-26

## 2022-10-26 RX ORDER — SODIUM CHLORIDE 9 MG/ML
75 INJECTION, SOLUTION INTRAVENOUS CONTINUOUS
Status: DISCONTINUED | OUTPATIENT
Start: 2022-10-26 | End: 2022-10-26 | Stop reason: HOSPADM

## 2022-10-26 RX ORDER — LIDOCAINE HYDROCHLORIDE 20 MG/ML
INJECTION, SOLUTION EPIDURAL; INFILTRATION; INTRACAUDAL; PERINEURAL AS NEEDED
Status: DISCONTINUED | OUTPATIENT
Start: 2022-10-26 | End: 2022-10-26 | Stop reason: HOSPADM

## 2022-10-26 RX ORDER — PROPOFOL 10 MG/ML
INJECTION, EMULSION INTRAVENOUS AS NEEDED
Status: DISCONTINUED | OUTPATIENT
Start: 2022-10-26 | End: 2022-10-26 | Stop reason: HOSPADM

## 2022-10-26 RX ORDER — NALOXONE HYDROCHLORIDE 0.4 MG/ML
0.4 INJECTION, SOLUTION INTRAMUSCULAR; INTRAVENOUS; SUBCUTANEOUS
Status: CANCELLED | OUTPATIENT
Start: 2022-10-26 | End: 2022-10-26

## 2022-10-26 RX ORDER — SODIUM CHLORIDE 0.9 % (FLUSH) 0.9 %
5-40 SYRINGE (ML) INJECTION EVERY 8 HOURS
Status: CANCELLED | OUTPATIENT
Start: 2022-10-26

## 2022-10-26 RX ORDER — SODIUM CHLORIDE 9 MG/ML
25 INJECTION, SOLUTION INTRAVENOUS CONTINUOUS
Status: CANCELLED | OUTPATIENT
Start: 2022-10-26 | End: 2022-10-26

## 2022-10-26 RX ORDER — DEXTROMETHORPHAN/PSEUDOEPHED 2.5-7.5/.8
1.2 DROPS ORAL
Status: CANCELLED | OUTPATIENT
Start: 2022-10-26

## 2022-10-26 RX ORDER — SODIUM CHLORIDE 9 MG/ML
INJECTION, SOLUTION INTRAVENOUS
Status: DISCONTINUED | OUTPATIENT
Start: 2022-10-26 | End: 2022-10-26 | Stop reason: HOSPADM

## 2022-10-26 RX ORDER — EPINEPHRINE 0.1 MG/ML
1 INJECTION INTRACARDIAC; INTRAVENOUS
Status: CANCELLED | OUTPATIENT
Start: 2022-10-26 | End: 2022-10-27

## 2022-10-26 RX ORDER — SODIUM CHLORIDE 0.9 % (FLUSH) 0.9 %
5-40 SYRINGE (ML) INJECTION AS NEEDED
Status: CANCELLED | OUTPATIENT
Start: 2022-10-26

## 2022-10-26 RX ADMIN — SODIUM CHLORIDE 75 ML/HR: 9 INJECTION, SOLUTION INTRAVENOUS at 08:47

## 2022-10-26 RX ADMIN — PROPOFOL 20 MG: 10 INJECTION, EMULSION INTRAVENOUS at 09:23

## 2022-10-26 RX ADMIN — PROPOFOL 30 MG: 10 INJECTION, EMULSION INTRAVENOUS at 09:30

## 2022-10-26 RX ADMIN — LIDOCAINE HYDROCHLORIDE 100 MG: 20 INJECTION, SOLUTION EPIDURAL; INFILTRATION; INTRACAUDAL; PERINEURAL at 09:18

## 2022-10-26 RX ADMIN — SODIUM CHLORIDE: 9 INJECTION, SOLUTION INTRAVENOUS at 09:11

## 2022-10-26 RX ADMIN — PROPOFOL 70 MG: 10 INJECTION, EMULSION INTRAVENOUS at 09:18

## 2022-10-26 RX ADMIN — PROPOFOL 60 MG: 10 INJECTION, EMULSION INTRAVENOUS at 09:25

## 2022-10-26 NOTE — ANESTHESIA PREPROCEDURE EVALUATION
Relevant Problems   CARDIOVASCULAR   (+) Atrial fibrillation (HCC)      ENDOCRINE   (+) Acquired hypothyroidism       Anesthetic History   No history of anesthetic complications            Review of Systems / Medical History  Patient summary reviewed, nursing notes reviewed and pertinent labs reviewed    Pulmonary  Within defined limits                 Neuro/Psych   Within defined limits           Cardiovascular            Dysrhythmias : atrial fibrillation      Exercise tolerance: >4 METS     GI/Hepatic/Renal  Within defined limits              Endo/Other      Hypothyroidism       Other Findings            Physical Exam    Airway  Mallampati: II  TM Distance: 4 - 6 cm  Neck ROM: normal range of motion   Mouth opening: Normal     Cardiovascular  Regular rate and rhythm,  S1 and S2 normal,  no murmur, click, rub, or gallop             Dental  No notable dental hx       Pulmonary  Breath sounds clear to auscultation               Abdominal  GI exam deferred       Other Findings            Anesthetic Plan    ASA: 2  Anesthesia type: MAC            Anesthetic plan and risks discussed with: Patient

## 2022-10-26 NOTE — ROUTINE PROCESS
TRANSFER - IN REPORT:    Verbal report received from Kari(name) on Carlos  being received from endo(unit) for routine progression of care      Report consisted of patients Situation, Background, Assessment and   Recommendations(SBAR). Information from the following report(s) SBAR, Procedure Summary, and MAR was reviewed with the receiving nurse. Opportunity for questions and clarification was provided. Assessment completed upon patients arrival to unit and care assumed.

## 2022-10-26 NOTE — H&P
Gastroenterology Outpatient History and Physical    Patient: Marlise Sober    Physician: Qiana Pereyra MD    Chief Complaint: hx of colon polyps  History of Present Illness: 71 yo F here for hx of colon polyps. In 2019, 4 TA's removed, largest 1.5 cm. History:  Past Medical History:   Diagnosis Date    Atrial fibrillation ([facility] [address] 2018    Maintained with Flecainide    Colon polyps 2019    Hypertension     Hypothyroidism       Past Surgical History:   Procedure Laterality Date    HX COLONOSCOPY  2019      Social History     Socioeconomic History    Marital status:    Tobacco Use    Smoking status: Former    Smokeless tobacco: Never   Substance and Sexual Activity    Alcohol use: No    Drug use: No    No family history on file. Patient Active Problem List   Diagnosis Code    [facility] I48.91    Acquired hypothyroidism E03.9       Allergies: No Known Allergies  Medications:   Prior to Admission medications    Medication Sig Start Date End Date Taking? Authorizing Provider   levothyroxine sodium (LEVOTHYROXINE PO) Take  by mouth. Yes Provider, Historical   flecainide acetate (FLECAINIDE PO) Take  by mouth. Yes Provider, Historical   aspirin delayed-release 81 mg tablet Take 81 mg by mouth daily. 2 tablets daily   Yes Provider, Historical   metoprolol succinate (TOPROL XL) 25 mg XL tablet 1/2 tablet twice a day 8/30/17  Yes Oksana Marsh MD   digoxin (LANOXIN) 0.25 mg tablet TAKE 1 TABLET BY MOUTH EVERY DAY  Patient not taking: Reported on 10/26/2022 7/23/18   Oksana Marsh MD   XARELTO 20 mg tab tablet TAKE 1 TABLET BY MOUTH EVERY DAY  Patient not taking: Reported on 10/26/2022 12/20/17   Oksana Marsh MD   thyroid, Pork, (ARMOUR) 60 mg tablet Take 45 mg by mouth daily. Patient not taking: Reported on 10/26/2022    Provider, Historical     Physical Exam:   Vital Signs: Blood pressure (!) 158/81, pulse 73, temperature 98 °F (36.7 °C), resp.  rate 19, height 5' 3\" (1.6 m), weight 57.1 kg (125 lb 12.8 oz), SpO2 98 %, not currently breastfeeding.   General: well developed, well nourished   HEENT: unremarkable   Heart: regular rhythm no mumur    Lungs: clear   Abdominal:  benign   Neurological: unremarkable   Extremities: no edema     Findings/Diagnosis: hx of colon polyps  Plan of Care/Planned Procedure: Colonoscopy with conscious/deep sedation    Signed:  Alonso Piña MD 10/26/2022

## 2022-10-26 NOTE — PROCEDURES
NAME:  Sher Sofia   :      MRN:   [id number]     Date/Time:  10/26/2022 9:17 AM    Colonoscopy Operative Report    Procedure Type:  Colonoscopy with polypectomy (cold snare)     Indications:  hx of colon polyps (2019, 4 TA's removed, largest 1.5 cm.)  Pre-operative Diagnosis: see indication above  Post-operative Diagnosis:  See findings below  :  Lulú Barahona MD  Referring Provider: María Lee MD    Exam:  Airway: clear, no airway problems anticipated  Heart: RRR, without gallops or rubs  Lungs: clear bilaterally without wheezes, crackles, or rhonchi  Abdomen: soft, nontender, nondistended, bowel sounds present  Mental Status: awake, alert and oriented to person, place and time    Sedation:  MAC anesthesia Propofol  Procedure Details:  After informed consent was obtained with all risks and benefits of procedure explained and preoperative exam completed, the patient was taken to the endoscopy suite and placed in the left lateral decubitus position. Upon sequential sedation as per above, a digital rectal exam was performed demonstrating internal and external hemorrhoids. The Olympus videocolonoscope  was inserted in the rectum and carefully advanced to the terminal ileum. The quality of preparation was good. The colonoscope was slowly withdrawn with careful evaluation between folds. Retroflexion in the rectum was completed demonstrating internal and external hemorrhoids. Findings:   ANUS: Anal exam reveals no masses but hemorrhoids, sphincter tone is normal.   RECTUM: Rectal exam reveals no masses or hemorrhoids. SIGMOID COLON: Mild diverticulosis, but otherwise normal.  DESCENDING COLON: Mild diverticulosis, but otherwise normal.  TRANSVERSE COLON:  -- sessile 0.5 cm polyp, removed with cold snare. -- otherwise, normal.  ASCENDING COLON: The mucosa is normal with good vascular pattern and without ulcers, diverticula, and polyps.    CECUM: The appendiceal orifice appears normal. The ileocecal valve appears normal.   TERMINAL ILEUM: The terminal ileum was not entered. Specimen Removed:  transverse colon polyp  Complications:  None. EBL:     None. Impression:  -- one colon polyp, removed as above  -- pan-colonic diverticulosis  -- internal and external hemorrhoids    Recommendations:   -- await pathology  -- repeat colonoscopy in 5 years     Discharge Disposition:  Home in the company of a  when able to ambulate.       Douglas Turcios MD

## 2022-10-26 NOTE — DISCHARGE INSTRUCTIONS
Judithhomero Irving  [id number]  [birth year]    COLON DISCHARGE INSTRUCTIONS  Discomfort:  Redness at IV site- apply warm compress to area; if redness or soreness persist- contact your physician  There may be a slight amount of blood passed from the rectum  Gaseous discomfort- walking, belching will help relieve any discomfort  You may not operate a vehicle for 12 hours  You may not engage in an occupation involving machinery or appliances for rest of today  You may not drink alcoholic beverages for at least 12 hours  Avoid making any critical decisions for at least 24 hour  DIET:   High fiber diet. - however -  remember your colon is empty and a heavy meal will produce gas. Avoid these foods:  vegetables, fried / greasy foods, carbonated drinks for today  MEDICATION:  (See attached)     ACTIVITY:  You may not resume your normal daily activities until tomorrow AM; it is recommended that you spend the remainder of the day resting -  avoid any strenuous activity. CALL M.D. ANY SIGN OF:   Increasing pain, nausea, vomiting  Abdominal distension (swelling)  New increased bleeding (oral or rectal)  Fever (chills)  Pain in chest area  Bloody discharge from nose or mouth  Shortness of breath      IMPRESSION:  -- only ONE polyp this time, and we removed it today  -- diverticulosis, which is when small out-pouchings in the inner lining of the colon form, little stretched out pockets. This is very common, and a diet high in fiber with the addition of a daily fiber supplement (like 2 teaspoons of metamucil or psyllium husk fiber powder mixed in water) can help treat this! -- we saw some hemorrhoids, which are very common, and these are swollen veins at the anal canal or end of the rectum, which can bulge with constipation and straining or frequent bowel movements. Sometimes they cause bleeding, burning, pressure, or even pain.  A diet high in fiber helps, but using a daily fiber supplement (like 2 teaspoons of psyllium husk powder or metamucil) can help treat these. Follow-up Instructions:   Call Dr. Viji Vogel for the results of procedure / biopsy in 7-10 days  Telephone # [phone]  Repeat colonoscopy in 5 years    Rohan Thompson MD  Patient Education on Sedation / Analgesia Administered for Procedure      For 24 hours after general anesthesia or intravenous analgesia / sedation:  Have someone responsible help you with your care  Limit your activities  Do not drive and operate hazardous machinery  Do not make important personal, legal or business decisions  Do not drink alcoholic beverages  If you have not urinated within 8 hours after discharge, please contact your physician  Resume your medications unless otherwise instructed    For 24 hours after general anesthesia or intravenous analgesia / sedation  you may experience:  Drowsiness, dizziness, sleepiness, or confusion  Difficulty remembering or delayed reaction times  Difficulty with your balance, especially while walking, move slowly and carefully, do not make sudden position changes  Difficulty focusing or blurred vision    You may not be aware of slight changes in your behavior and/or your reaction time because of the medication used during and after your procedure.     Report the following to your physician:  Excessive pain, swelling, redness or odor of or around the surgical area  Temperature over 100.5  Nausea and vomiting lasting longer than 4 hours or if unable to take medications  Any signs of decreased circulation or nerve impairment to extremity: change in color, persistent numbness, tingling, coldness or increase pain  Any questions or concerns    IF YOU REPORT TO AN EMERGENCY ROOM, DOCTOR'S OFFICE OR HOSPITAL WITHIN 24 HOURS AFTER YOUR PROCEDURE, BRING THIS SHEET AND YOUR AFTER VISIT SUMMARY WITH YOU AND GIVE IT TO THE PHYSICIAN OR NURSE ATTENDING YOU.

## 2022-10-26 NOTE — PERIOP NOTES
Endoscopy Case End Note:    0935:  Procedure scope was pre-cleaned, per protocol, at bedside by Rexford Kanner. 1398:  Report received from anesthesia - Shayna Berger CRNA. See anesthesia flowsheet for intra-procedure vital signs and events. 8331:  Glasses returned to patient.

## 2022-10-26 NOTE — ANESTHESIA POSTPROCEDURE EVALUATION
Procedure(s):  COLONOSCOPY  ENDOSCOPIC POLYPECTOMY. MAC    Anesthesia Post Evaluation        Patient location during evaluation: PACU  Note status: Adequate. Level of consciousness: responsive to verbal stimuli and sleepy but conscious  Pain management: satisfactory to patient  Airway patency: patent  Anesthetic complications: no  Cardiovascular status: acceptable  Respiratory status: acceptable  Hydration status: acceptable  Comments: +Post-Anesthesia Evaluation and Assessment    Patient: Darian Nixon MRN: [id number]  SSN: [id number]   YOB: 1950  Age: 70 y.o. Sex: female      Cardiovascular Function/Vital Signs    /61   Pulse 60   Temp 36.7 °C (98 °F)   Resp 13   Ht 5' 3\" (1.6 m)   Wt 57.1 kg (125 lb 12.8 oz)   SpO2 99%   Breastfeeding No   BMI 22.28 kg/m²     Patient is status post Procedure(s):  COLONOSCOPY  ENDOSCOPIC POLYPECTOMY. Nausea/Vomiting: Controlled. Postoperative hydration reviewed and adequate. Pain:  Pain Scale 1: Numeric (0 - 10) (10/26/22 0951)  Pain Intensity 1: 0 (10/26/22 0951)   Managed. Neurological Status: At baseline. Mental Status and Level of Consciousness: Arousable. Pulmonary Status:   O2 Device: None (Room air) (10/26/22 0951)   Adequate oxygenation and airway patent. Complications related to anesthesia: None    Post-anesthesia assessment completed. No concerns. Signed By: Yassine Santos MD    10/26/2022  Post anesthesia nausea and vomiting:  controlled      INITIAL Post-op Vital signs:   Vitals Value Taken Time   /61 10/26/22 0954   Temp 36.7 °C (98 °F) 10/26/22 0948   Pulse 54 10/26/22 0956   Resp 14 10/26/22 0956   SpO2 99 % 10/26/22 0956   Vitals shown include unvalidated device data.

## (undated) DEVICE — CATH IV AUTOGRD BC PNK 20GA 25 -- INSYTE

## (undated) DEVICE — SYR 10ML LUER LOK 1/5ML GRAD --

## (undated) DEVICE — SOLIDIFIER FLD 2OZ 1500CC N DISINF IN BTL DISP SAFESORB

## (undated) DEVICE — SNARE ENDOSCP M L240CM W27MM SHTH DIA2.4MM CHN 2.8MM OVL

## (undated) DEVICE — HYPODERMIC SAFETY NEEDLE: Brand: MAGELLAN

## (undated) DEVICE — TRAP,MUCUS SPECIMEN, 80CC: Brand: MEDLINE

## (undated) DEVICE — CONTAINER SPEC 20 ML LID NEUT BUFF FORMALIN 10 % POLYPR STS

## (undated) DEVICE — Device

## (undated) DEVICE — SET ADMIN 16ML TBNG L100IN 2 Y INJ SITE IV PIGGY BK DISP (ORDER IN MULIPLES OF 48)

## (undated) DEVICE — TOWEL 4 PLY TISS 19X30 SUE WHT

## (undated) DEVICE — 1200 GUARD II KIT W/5MM TUBE W/O VAC TUBE: Brand: GUARDIAN

## (undated) DEVICE — Z DISCONTINUED PER MEDLINE LINE GAS SAMPLING O2/CO2 LNG AD 13 FT NSL W/ TBNG FILTERLINE

## (undated) DEVICE — BASIN EMSIS 16OZ GRAPHITE PLAS KID SHP MOLD GRAD FOR ORAL

## (undated) DEVICE — ELECTRODE,RADIOTRANSLUCENT,FOAM,5PK: Brand: MEDLINE

## (undated) DEVICE — NON-REM POLYHESIVE PATIENT RETURN ELECTRODE: Brand: VALLEYLAB

## (undated) DEVICE — SYR 3ML LL TIP 1/10ML GRAD --

## (undated) DEVICE — NEONATAL-ADULT SPO2 SENSOR: Brand: NELLCOR

## (undated) DEVICE — STRAINER URIN CALC RNL MSH -- CONVERT TO ITEM 357634